# Patient Record
Sex: FEMALE | Race: WHITE | Employment: FULL TIME | URBAN - METROPOLITAN AREA
[De-identification: names, ages, dates, MRNs, and addresses within clinical notes are randomized per-mention and may not be internally consistent; named-entity substitution may affect disease eponyms.]

---

## 2018-09-18 ENCOUNTER — OFFICE VISIT (OUTPATIENT)
Dept: OBGYN CLINIC | Facility: CLINIC | Age: 53
End: 2018-09-18
Payer: COMMERCIAL

## 2018-09-18 VITALS
SYSTOLIC BLOOD PRESSURE: 120 MMHG | DIASTOLIC BLOOD PRESSURE: 80 MMHG | HEART RATE: 68 BPM | WEIGHT: 164.63 LBS | BODY MASS INDEX: 28.1 KG/M2 | HEIGHT: 64.25 IN

## 2018-09-18 DIAGNOSIS — Z12.4 SCREENING FOR MALIGNANT NEOPLASM OF CERVIX: ICD-10-CM

## 2018-09-18 DIAGNOSIS — Z01.419 ENCOUNTER FOR GYNECOLOGICAL EXAMINATION WITHOUT ABNORMAL FINDING: Primary | ICD-10-CM

## 2018-09-18 DIAGNOSIS — Z12.31 VISIT FOR SCREENING MAMMOGRAM: ICD-10-CM

## 2018-09-18 PROCEDURE — 99386 PREV VISIT NEW AGE 40-64: CPT | Performed by: OBSTETRICS & GYNECOLOGY

## 2018-09-18 RX ORDER — CARVEDILOL 12.5 MG/1
TABLET ORAL
COMMUNITY
Start: 2018-08-18

## 2018-09-18 RX ORDER — LISINOPRIL 5 MG/1
TABLET ORAL
COMMUNITY
Start: 2018-08-18

## 2018-09-18 NOTE — PROGRESS NOTES
Anais Hughes is a 48year old female [facility] Patient's last menstrual period was 05/08/2016. who presents for Patient presents with:  Gyn Exam: annual  She has no complaints. Pt has not had a pap for the past 10 years.       OBSTETRICS HISTORY:  Obstetric Hi Denies fatigue, night sweats, hot flashes  Eyes:  denies blurred or double vision  Cardiovascular:  denies chest pain or palpitations  Respiratory:  denies shortness of breath  Gastrointestinal:  denies heartburn, abdominal pain, diarrhea or constipation done,mammogram ordered,rtc 1 year for annual exam  Encounter for gynecological examination without abnormal finding  (primary encounter diagnosis)  No orders of the defined types were placed in this encounter.     Requested Prescriptions      No prescriptio

## 2018-09-19 LAB — HPV I/H RISK 1 DNA SPEC QL NAA+PROBE: NEGATIVE

## 2018-09-21 NOTE — PROGRESS NOTES
Neg pap, HPV neg, repeat pap needed in 3 years, return to clinic 1 year for annual exam Letter Sent via mail.

## 2018-09-26 ENCOUNTER — HOSPITAL ENCOUNTER (OUTPATIENT)
Dept: MAMMOGRAPHY | Age: 53
Discharge: HOME | End: 2018-09-26
Attending: OBSTETRICS & GYNECOLOGY
Payer: COMMERCIAL

## 2018-09-26 DIAGNOSIS — Z12.31 VISIT FOR SCREENING MAMMOGRAM: ICD-10-CM

## 2018-09-26 PROCEDURE — 77067 SCR MAMMO BI INCL CAD: CPT | Performed by: OBSTETRICS & GYNECOLOGY

## 2018-09-26 PROCEDURE — 77063 BREAST TOMOSYNTHESIS BI: CPT | Performed by: OBSTETRICS & GYNECOLOGY

## 2018-10-09 ENCOUNTER — HOSPITAL ENCOUNTER (OUTPATIENT)
Dept: ULTRASOUND IMAGING | Facility: HOSPITAL | Age: 53
Discharge: HOME | End: 2018-10-09
Attending: OBSTETRICS & GYNECOLOGY
Payer: COMMERCIAL

## 2018-10-09 ENCOUNTER — HOSPITAL ENCOUNTER (OUTPATIENT)
Dept: MAMMOGRAPHY | Facility: HOSPITAL | Age: 53
Discharge: HOME | End: 2018-10-09
Attending: OBSTETRICS & GYNECOLOGY
Payer: COMMERCIAL

## 2018-10-09 DIAGNOSIS — R92.8 ABNORMAL MAMMOGRAM: ICD-10-CM

## 2018-10-09 PROCEDURE — 77061 BREAST TOMOSYNTHESIS UNI: CPT | Performed by: OBSTETRICS & GYNECOLOGY

## 2018-10-09 PROCEDURE — 77065 DX MAMMO INCL CAD UNI: CPT | Performed by: OBSTETRICS & GYNECOLOGY

## 2018-10-09 PROCEDURE — 76642 ULTRASOUND BREAST LIMITED: CPT | Performed by: OBSTETRICS & GYNECOLOGY

## 2018-10-12 NOTE — PROGRESS NOTES
Benign appearing findings on mammogram, needs right diagnostic mammogram and right breast US in 6 months, please order and call pt

## 2018-10-17 ENCOUNTER — TELEPHONE (OUTPATIENT)
Dept: OBGYN CLINIC | Facility: CLINIC | Age: 53
End: 2018-10-17

## 2018-10-17 DIAGNOSIS — R92.2 DENSE BREAST TISSUE: Primary | ICD-10-CM

## 2018-10-17 NOTE — TELEPHONE ENCOUNTER
----- Message from Fox Mendoza MD sent at 10/11/2018 10:22 PM CDT -----  Benign appearing findings on mammogram, needs right diagnostic mammogram and right breast US in 6 months, please order and call pt

## 2019-05-01 ENCOUNTER — TELEPHONE (OUTPATIENT)
Dept: OBGYN CLINIC | Facility: CLINIC | Age: 54
End: 2019-05-01

## 2019-05-01 NOTE — TELEPHONE ENCOUNTER
LMTCB. Pt suppose to schedule right diag mammogram and right breast u/s in 6 months. Need to know if pt is going to schedule it.

## 2019-05-02 NOTE — TELEPHONE ENCOUNTER
PT REMINDED TO DO MAMMO AND POSS ULTRASOUND. SHE PLANS TO DO THIS IN June ONCE SHE IS DONE WITH SCHOOL FOR THE SUMMER. I GAVE HER THE CODES SO SHE CAN CHECK WITH HER INSURANCE.

## 2019-08-07 NOTE — TELEPHONE ENCOUNTER
Pt had a mammogram 10/2018. Recs by [facility] were benign appearing findings on mamm, needs right diag mammogram and right breast u/s in 6 months. It was due April 2019. Several messages were left for the pt.   Nurse talked to the pt 5/2019 and stated she wo

## 2019-09-17 ENCOUNTER — HOSPITAL ENCOUNTER (OUTPATIENT)
Dept: MAMMOGRAPHY | Facility: HOSPITAL | Age: 54
Discharge: HOME | End: 2019-09-17
Attending: OBSTETRICS & GYNECOLOGY
Payer: COMMERCIAL

## 2019-09-17 ENCOUNTER — HOSPITAL ENCOUNTER (OUTPATIENT)
Dept: ULTRASOUND IMAGING | Facility: HOSPITAL | Age: 54
Discharge: HOME | End: 2019-09-17
Attending: OBSTETRICS & GYNECOLOGY
Payer: COMMERCIAL

## 2019-09-17 DIAGNOSIS — R92.2 DENSE BREAST TISSUE: ICD-10-CM

## 2019-09-17 PROCEDURE — 77061 BREAST TOMOSYNTHESIS UNI: CPT | Performed by: OBSTETRICS & GYNECOLOGY

## 2019-09-17 PROCEDURE — 77065 DX MAMMO INCL CAD UNI: CPT | Performed by: OBSTETRICS & GYNECOLOGY

## 2019-09-17 PROCEDURE — 76642 ULTRASOUND BREAST LIMITED: CPT | Performed by: OBSTETRICS & GYNECOLOGY

## 2019-09-21 ENCOUNTER — TELEPHONE (OUTPATIENT)
Dept: OBGYN CLINIC | Facility: CLINIC | Age: 54
End: 2019-09-21

## 2019-09-21 NOTE — TELEPHONE ENCOUNTER
----- Message from Gina Montilla MD sent at 9/17/2019  8:09 PM CDT -----  Incomplete mammogram,needs additional views,call pt

## 2019-09-30 NOTE — TELEPHONE ENCOUNTER
Pt states she was called by radiology and was told she needed to come back for an [address]. Pt states she had an US at the same time as her mammo. Radiology told her they would look into it and told her that she is correct and now she needs to come back in 6 months for a follow up. Message to [facility]. See addendum in mammo report.

## 2019-10-26 ENCOUNTER — HOSPITAL ENCOUNTER (OUTPATIENT)
Age: 54
Discharge: HOME | End: 2019-10-26
Attending: EMERGENCY MEDICINE
Payer: COMMERCIAL

## 2019-10-26 VITALS
WEIGHT: 175 LBS | TEMPERATURE: 97 F | DIASTOLIC BLOOD PRESSURE: 65 MMHG | SYSTOLIC BLOOD PRESSURE: 116 MMHG | RESPIRATION RATE: 18 BRPM | HEART RATE: 92 BPM | OXYGEN SATURATION: 100 % | BODY MASS INDEX: 30 KG/M2

## 2019-10-26 DIAGNOSIS — J01.90 ACUTE SINUSITIS, RECURRENCE NOT SPECIFIED, UNSPECIFIED LOCATION: Primary | ICD-10-CM

## 2019-10-26 PROCEDURE — 99213 OFFICE O/P EST LOW 20 MIN: CPT

## 2019-10-26 PROCEDURE — 99204 OFFICE O/P NEW MOD 45 MIN: CPT

## 2019-10-26 RX ORDER — CEFDINIR 300 MG/1
300 CAPSULE ORAL 2 TIMES DAILY
Qty: 20 CAPSULE | Refills: 0 | Status: SHIPPED | OUTPATIENT
Start: 2019-10-26 | End: 2019-11-05

## 2019-10-26 RX ORDER — PREDNISONE 20 MG/1
40 TABLET ORAL DAILY
Qty: 10 TABLET | Refills: 0 | Status: SHIPPED | OUTPATIENT
Start: 2019-10-28 | End: 2019-11-02

## 2019-10-26 NOTE — ED PROVIDER NOTES
Patient Seen in: [address]      History   Patient presents with:  Sinus Problem    Stated Complaint: sinus pain     HPI    This patient complains of sinus pressure with a fullness sensation in both ears.   Patient denies palpation  Lungs are clear to auscultation  Cardiac there are normal first and second heart sounds  Neurologic there are no gross cranial nerve deficits patient moves all 4 extremities without impairment          MDM     Discussed empiric antibiotic treatm

## 2019-10-26 NOTE — ED INITIAL ASSESSMENT (HPI)
PATIENT ARRIVED AMBULATORY TO ROOM. PATIENT STATES \"I HAD SYMPTOMS A FEW WEEKS AGO. I THOUGHT I WAS GETTING BETTER BUT LAST NIGHT IT GOT BAD AGAIN\" NASAL CONGESTION. +SINUS/FACIAL PRESSURE. GREEN DISCHARGE FROM NOSE. NO COUGH. NO FEVERS.  NO N/V/D. EASY N

## 2021-03-03 DIAGNOSIS — Z23 NEED FOR VACCINATION: ICD-10-CM

## 2021-06-17 ENCOUNTER — TELEPHONE (OUTPATIENT)
Dept: OBGYN CLINIC | Facility: CLINIC | Age: 56
End: 2021-06-17

## 2021-06-17 DIAGNOSIS — R92.2 INCONCLUSIVE MAMMOGRAM: Primary | ICD-10-CM

## 2021-06-17 NOTE — TELEPHONE ENCOUNTER
Pt calling to request \"order for mammo and US if needed\" States mom with hx of breast CA at age 62. Denies any breast issues. States could not come in last year and was told need pap every 2 yrs.  Explained to pt that a pap is not necessarily done every y

## 2021-06-18 NOTE — TELEPHONE ENCOUNTER
Please send order for bilateral diagnostic mammogram with right breast ultrasound, diagnosis is previous inconclusive mammogram

## 2021-06-28 ENCOUNTER — HOSPITAL ENCOUNTER (OUTPATIENT)
Dept: ULTRASOUND IMAGING | Facility: HOSPITAL | Age: 56
Discharge: HOME | End: 2021-06-28
Attending: OBSTETRICS & GYNECOLOGY
Payer: COMMERCIAL

## 2021-06-28 ENCOUNTER — HOSPITAL ENCOUNTER (OUTPATIENT)
Dept: MAMMOGRAPHY | Facility: HOSPITAL | Age: 56
Discharge: HOME | End: 2021-06-28
Attending: OBSTETRICS & GYNECOLOGY
Payer: COMMERCIAL

## 2021-06-28 DIAGNOSIS — R92.2 INCONCLUSIVE MAMMOGRAM: ICD-10-CM

## 2021-06-28 PROCEDURE — 77062 BREAST TOMOSYNTHESIS BI: CPT | Performed by: OBSTETRICS & GYNECOLOGY

## 2021-06-28 PROCEDURE — 77066 DX MAMMO INCL CAD BI: CPT | Performed by: OBSTETRICS & GYNECOLOGY

## 2021-06-28 PROCEDURE — 76642 ULTRASOUND BREAST LIMITED: CPT | Performed by: OBSTETRICS & GYNECOLOGY

## 2021-09-21 ENCOUNTER — OFFICE VISIT (OUTPATIENT)
Dept: OBGYN CLINIC | Facility: CLINIC | Age: 56
End: 2021-09-21
Payer: COMMERCIAL

## 2021-09-21 VITALS
WEIGHT: 159 LBS | SYSTOLIC BLOOD PRESSURE: 114 MMHG | DIASTOLIC BLOOD PRESSURE: 73 MMHG | BODY MASS INDEX: 27 KG/M2 | HEART RATE: 61 BPM

## 2021-09-21 DIAGNOSIS — Z12.4 SCREENING FOR MALIGNANT NEOPLASM OF CERVIX: ICD-10-CM

## 2021-09-21 DIAGNOSIS — Z12.31 VISIT FOR SCREENING MAMMOGRAM: ICD-10-CM

## 2021-09-21 DIAGNOSIS — Z01.419 ENCOUNTER FOR GYNECOLOGICAL EXAMINATION WITHOUT ABNORMAL FINDING: Primary | ICD-10-CM

## 2021-09-21 PROCEDURE — 99396 PREV VISIT EST AGE 40-64: CPT | Performed by: OBSTETRICS & GYNECOLOGY

## 2021-09-21 PROCEDURE — 3078F DIAST BP <80 MM HG: CPT | Performed by: OBSTETRICS & GYNECOLOGY

## 2021-09-21 PROCEDURE — 3074F SYST BP LT 130 MM HG: CPT | Performed by: OBSTETRICS & GYNECOLOGY

## 2021-09-21 NOTE — PROGRESS NOTES
Erich Nichols is a 64year old female  No LMP recorded. (Menstrual status: Menopause). who presents for Patient presents with:  Gyn Exam: ANNUAL,MAMMO   She has no complaints.     OBSTETRICS HISTORY:  OB History     T0    L0    SAB0  IAB0 heartburn, abdominal pain, diarrhea or constipation  Genitourinary:  denies dysuria, incontinence, abnormal vaginal discharge, vaginal itching  Musculoskeletal:  denies back pain. Skin/Breast:  Denies any breast pain, lumps, or discharge.    Neurological: gynecological examination without abnormal finding  (primary encounter diagnosis)  Visit for screening mammogram  No orders of the defined types were placed in this encounter.     Requested Prescriptions      No prescriptions requested or ordered in this en

## 2021-09-22 LAB — HPV I/H RISK 1 DNA SPEC QL NAA+PROBE: NEGATIVE

## 2022-07-14 ENCOUNTER — HOSPITAL ENCOUNTER (OUTPATIENT)
Dept: MAMMOGRAPHY | Facility: HOSPITAL | Age: 57
Discharge: HOME | End: 2022-07-14
Attending: OBSTETRICS & GYNECOLOGY
Payer: COMMERCIAL

## 2022-07-14 DIAGNOSIS — Z12.31 VISIT FOR SCREENING MAMMOGRAM: ICD-10-CM

## 2022-07-14 PROCEDURE — 77063 BREAST TOMOSYNTHESIS BI: CPT | Performed by: OBSTETRICS & GYNECOLOGY

## 2022-07-14 PROCEDURE — 77067 SCR MAMMO BI INCL CAD: CPT | Performed by: OBSTETRICS & GYNECOLOGY

## 2022-11-19 ENCOUNTER — HOSPITAL ENCOUNTER (OUTPATIENT)
Age: 57
Discharge: HOME | End: 2022-11-19
Attending: EMERGENCY MEDICINE
Payer: COMMERCIAL

## 2022-11-19 VITALS
RESPIRATION RATE: 20 BRPM | HEART RATE: 80 BPM | TEMPERATURE: 98 F | DIASTOLIC BLOOD PRESSURE: 72 MMHG | OXYGEN SATURATION: 98 % | SYSTOLIC BLOOD PRESSURE: 132 MMHG

## 2022-11-19 DIAGNOSIS — H66.001 NON-RECURRENT ACUTE SUPPURATIVE OTITIS MEDIA OF RIGHT EAR WITHOUT SPONTANEOUS RUPTURE OF TYMPANIC MEMBRANE: Primary | ICD-10-CM

## 2022-11-19 LAB — SARS-COV-2 RNA RESP QL NAA+PROBE: NOT DETECTED

## 2022-11-19 PROCEDURE — 99203 OFFICE O/P NEW LOW 30 MIN: CPT

## 2022-11-19 PROCEDURE — 99213 OFFICE O/P EST LOW 20 MIN: CPT

## 2022-11-19 RX ORDER — AMOXICILLIN 500 MG/1
500 TABLET, FILM COATED ORAL 3 TIMES DAILY
Qty: 30 TABLET | Refills: 0 | Status: SHIPPED | OUTPATIENT
Start: 2022-11-19 | End: 2022-11-29

## 2022-11-19 RX ORDER — BENZONATATE 100 MG/1
100 CAPSULE ORAL 3 TIMES DAILY PRN
Qty: 30 CAPSULE | Refills: 0 | Status: SHIPPED | OUTPATIENT
Start: 2022-11-19 | End: 2022-12-19

## 2022-11-19 NOTE — ED INITIAL ASSESSMENT (HPI)
Patient with cough and congestion starting on Thursday. Also with right ear pain. Negative at home covid tests.

## 2024-07-16 ENCOUNTER — TELEPHONE (OUTPATIENT)
Dept: OBGYN CLINIC | Facility: CLINIC | Age: 59
End: 2024-07-16

## 2024-07-16 NOTE — TELEPHONE ENCOUNTER
Patient wants a mammogram order. Patient last annual with Dr Osborne was 9/21/21. Patient scheduled apt for annual 2/4/24

## 2024-07-16 NOTE — TELEPHONE ENCOUNTER
Phone rang then went to busy signal without an option to leave a voicemail. PSR when patient calls back please notify that since she has been seen in 3 years then she needs breast exam first during annual visit and Dr. Osborne will provide appropriate mammogram order at that time. There are options for sooner gyne slot with Dr. Osborne in December.

## 2024-11-18 ENCOUNTER — OFFICE VISIT (OUTPATIENT)
Dept: OBGYN CLINIC | Facility: CLINIC | Age: 59
End: 2024-11-18
Payer: COMMERCIAL

## 2024-11-18 VITALS
HEART RATE: 74 BPM | WEIGHT: 154.38 LBS | DIASTOLIC BLOOD PRESSURE: 77 MMHG | SYSTOLIC BLOOD PRESSURE: 122 MMHG | BODY MASS INDEX: 26 KG/M2

## 2024-11-18 DIAGNOSIS — Z12.31 ENCOUNTER FOR SCREENING MAMMOGRAM FOR BREAST CANCER: Primary | ICD-10-CM

## 2024-11-18 DIAGNOSIS — Z01.419 ENCOUNTER FOR GYNECOLOGICAL EXAMINATION: ICD-10-CM

## 2024-11-18 PROCEDURE — 3074F SYST BP LT 130 MM HG: CPT | Performed by: OBSTETRICS & GYNECOLOGY

## 2024-11-18 PROCEDURE — 3078F DIAST BP <80 MM HG: CPT | Performed by: OBSTETRICS & GYNECOLOGY

## 2024-11-18 PROCEDURE — 99396 PREV VISIT EST AGE 40-64: CPT | Performed by: OBSTETRICS & GYNECOLOGY

## 2024-11-18 RX ORDER — ATORVASTATIN CALCIUM 20 MG/1
20 TABLET, FILM COATED ORAL DAILY
COMMUNITY

## 2024-11-19 NOTE — PROGRESS NOTES
Azucena Hurd is a 59 year old female  No LMP recorded. (Menstrual status: Menopause). here for annual exam.       [facility] pt--last seen 2021.   Last pap 2021 normal with negative HPV.   Pt does not want pap today.  Last mammogram 2022.    No gyne issues.    OBSTETRICS HISTORY:  OB History    Para Term  AB Living   0 0 0 0 0 0   SAB IAB Ectopic Multiple Live Births   0 0 0 0 0       GYNE HISTORY   Pap Date: 21  Pap Result Notes: PAP HPV NEG  Follow Up Recommendation: MAMMO 23 TERRI BENIGN  ANNUAL 21 [facility]    MEDICAL HISTORY:  Past Medical History:    Congestive heart failure (CHF) (HCC)     Past Surgical History:   Procedure Laterality Date    Breast biopsy Left     x 2, benign    Inner ear surgery proc unlisted      reconstruction, multiple surgeries as she grew up    Needle biopsy right      Reduction left  1995    Reduction of large breast      Reduction right         SOCIAL HISTORY:  Social History     Socioeconomic History    Marital status:    Tobacco Use    Smoking status: Never    Smokeless tobacco: Never   Vaping Use    Vaping status: Never Used   Substance and Sexual Activity    Alcohol use: No    Drug use: No    Sexual activity: Yes     Partners: Male     Social Drivers of Health     Financial Resource Strain: Low Risk  (2024)    Received from [facility]    Overall Financial Resource Strain (CARDIA)     Difficulty of Paying Living Expenses: Not very hard   Food Insecurity: No Food Insecurity (2024)    Received from [facility]    Hunger Vital Sign     Worried About Running Out of Food in the Last Year: Never true     Ran Out of Food in the Last Year: Never true   Transportation Needs: No Transportation Needs (2024)    Received from [facility]    PRAPARE - Transportation     Lack of Transportation (Medical): No     Lack of Transportation (Non-Medical): No   Housing  Stability: Low Risk  (7/18/2024)    Received from [facility]    Housing Stability Vital Sign     Unable to Pay for Housing in the Last Year: No     Number of Places Lived in the Last Year: 1     Unstable Housing in the Last Year: No       FAMILY HISTORY:  Family History   Problem Relation Age of Onset    Cancer Father 60        bladder cancer    Diabetes Father     Heart Disorder Father 65        Congested heart failure    Cancer Mother 57        breast cancer    Breast Cancer Mother 58    Heart Disorder Maternal Grandfather 55        Heart attack    Other (Other) Paternal Grandmother 70        Stroke    Breast Cancer Maternal Cousin Female 56       MEDICATIONS:  Current Outpatient Medications   Medication Sig Dispense Refill    atorvastatin 20 MG Oral Tab Take 1 tablet (20 mg total) by mouth daily.      carvedilol 12.5 MG Oral Tab       lisinopril 5 MG Oral Tab          ALLERGIES:  Allergies[1]      Depression Screening (PHQ-2/PHQ-9): Over the LAST 2 WEEKS   Little interest or pleasure in doing things (over the last two weeks)?: Not at all    Feeling down, depressed, or hopeless (over the last two weeks)?: Not at all    PHQ-2 SCORE: 0           Review of Systems:  Constitutional:  Denies fatigue, night sweats, hot flashes  Eyes:  denies blurred or double vision  Cardiovascular:  denies chest pain or palpitations  Respiratory:  denies shortness of breath  Gastrointestinal:  denies heartburn, abdominal pain, diarrhea or constipation  Genitourinary:  denies dysuria, incontinence, abnormal vaginal discharge, vaginal itching  Musculoskeletal:  denies back pain.  Skin/Breast:  Denies any breast pain, lumps, or discharge.   Neurological:  denies headaches, extremity weakness or numbness.  Psychiatric: denies depression or anxiety.  Endocrine:   denies excessive thirst or urination.  Heme/Lymph:  easy bruising or bleeding.    PHYSICAL EXAM:   /77   Pulse 74   Wt 154 lb 6.4 oz (70 kg)   BMI  26.30 kg/m²   Wt Readings from Last 2 Encounters:   11/18/24 154 lb 6.4 oz (70 kg)   09/21/21 159 lb (72.1 kg)     Body mass index is 26.3 kg/m².    Constitutional: well developed, well nourished  Neck/Thyroid: thyroid symmetric, no nodules  Heart:  Regular rate and rhythm  Lungs:  Clear to asculation  Breast: normal without palpable masses, tenderness, asymmetry, nipple discharge, nipple retraction or skin changes  Abdomen:  soft, nontender, nondistended, no masses  Psychiatric:  Oriented to time, place, person and situation. Appropriate mood and affect    Pelvic Exam:  External Genitalia: normal appearance, hair distribution, and no lesions  Urethral Meatus:  normal in size, location, without lesions and prolapse  Bladder:  No fullness, masses or tenderness  Vagina:  Normal appearance without lesions, no abnormal discharge  Cervix:  Normal without tenderness on motion  Uterus: normal in size, contour, position, mobility, without tenderness  Adnexa: normal without masses or tenderness  Perineum/anus: normal      Assessment & Plan:    Azucena Hurd is a 59 year old female who presents for an annual physical exam.    1. Encounter for gynecological examination  Pap not done.  ASCCP guidelines reviewed.   Encouraged annual exam.   Order for mammogram.   RTC 1 year or prn     2. Encounter for screening mammogram for breast cancer  - [facility] 2D+3D SCREENING BILAT (CPT=77067/11700); Future        Requested Prescriptions      No prescriptions requested or ordered in this encounter         Ana Johnson MD  11/19/2024  9:04 AM         [1] No Known Allergies

## 2024-12-21 ENCOUNTER — HOSPITAL ENCOUNTER (OUTPATIENT)
Dept: MAMMOGRAPHY | Age: 59
Discharge: HOME | End: 2024-12-21
Attending: OBSTETRICS & GYNECOLOGY
Payer: COMMERCIAL

## 2024-12-21 DIAGNOSIS — Z12.31 ENCOUNTER FOR SCREENING MAMMOGRAM FOR BREAST CANCER: ICD-10-CM

## 2024-12-21 PROCEDURE — 77063 BREAST TOMOSYNTHESIS BI: CPT | Performed by: OBSTETRICS & GYNECOLOGY

## 2024-12-21 PROCEDURE — 77067 SCR MAMMO BI INCL CAD: CPT | Performed by: OBSTETRICS & GYNECOLOGY

## 2024-12-26 ENCOUNTER — HOSPITAL ENCOUNTER (OUTPATIENT)
Age: 59
Discharge: HOME | End: 2024-12-26
Attending: EMERGENCY MEDICINE
Payer: COMMERCIAL

## 2024-12-26 VITALS
TEMPERATURE: 98 F | SYSTOLIC BLOOD PRESSURE: 96 MMHG | RESPIRATION RATE: 16 BRPM | DIASTOLIC BLOOD PRESSURE: 56 MMHG | OXYGEN SATURATION: 98 % | HEART RATE: 90 BPM

## 2024-12-26 DIAGNOSIS — J01.41 ACUTE RECURRENT PANSINUSITIS: Primary | ICD-10-CM

## 2024-12-26 PROCEDURE — 99213 OFFICE O/P EST LOW 20 MIN: CPT

## 2024-12-26 RX ORDER — BENZONATATE 100 MG/1
100 CAPSULE ORAL 3 TIMES DAILY PRN
Qty: 30 CAPSULE | Refills: 0 | Status: SHIPPED | OUTPATIENT
Start: 2024-12-26 | End: 2025-01-25

## 2024-12-26 NOTE — ED INITIAL ASSESSMENT (HPI)
Patient arrives ambulatory with c/o 10 days of cough and sinus pain, worse at night. Denies fevers.

## 2024-12-26 NOTE — ED PROVIDER NOTES
Patient Seen in: [facility]      History     Chief Complaint   Patient presents with    Cough/URI    Sinus Problem     Stated Complaint: URI/Cough    Subjective:   HPI      The patient is a 59-year-old female with past history of recurrent sinusitis, reconstructive ear surgery who presents now with cough, sinus pain/pressure/drainage.  The patient states the symptoms began approximately 10 days ago.  Patient denies any fever.  The patient describes persistent sinus pain and pressure associated with greenish nasal discharge.  The patient has also had some persistent cough.  Patient denies any fever.  The patient denies any chest pain.    Objective:     Past Medical History:    Congestive heart failure (CHF) (HCC)              Past Surgical History:   Procedure Laterality Date    Breast biopsy Left     x 2, benign    Inner ear surgery proc unlisted  1996    reconstruction, multiple surgeries as she grew up    Needle biopsy right  2006    Reduction left  1995    Reduction of large breast  1995    Reduction right  1995                Social History     Socioeconomic History    Marital status:    Tobacco Use    Smoking status: Never    Smokeless tobacco: Never   Vaping Use    Vaping status: Never Used   Substance and Sexual Activity    Alcohol use: No    Drug use: No    Sexual activity: Yes     Partners: Male     Social Drivers of Health     Financial Resource Strain: Low Risk  (7/18/2024)    Received from [facility]    Overall Financial Resource Strain (CARDIA)     Difficulty of Paying Living Expenses: Not very hard   Food Insecurity: No Food Insecurity (7/18/2024)    Received from [facility]    Hunger Vital Sign     Worried About Running Out of Food in the Last Year: Never true     Ran Out of Food in the Last Year: Never true   Transportation Needs: No Transportation Needs (7/18/2024)    Received from [facility]    PRAPAR -  Transportation     Lack of Transportation (Medical): No     Lack of Transportation (Non-Medical): No   Housing Stability: Low Risk  (7/18/2024)    Received from [facility]    Housing Stability Vital Sign     Unable to Pay for Housing in the Last Year: No     Number of Places Lived in the Last Year: 1     Unstable Housing in the Last Year: No              Review of Systems    Positive for stated complaint: URI/Cough  Other systems are as noted in HPI.  Constitutional and vital signs reviewed.      All other systems reviewed and negative except as noted above.    Physical Exam     ED Triage Vitals [12/26/24 0824]   BP 96/56   Pulse 90   Resp 16   Temp 98.1 °F (36.7 °C)   Temp src Oral   SpO2 98 %   O2 Device None (Room air)       Current Vitals:   Vital Signs  BP: 96/56  Pulse: 90  Resp: 16  Temp: 98.1 °F (36.7 °C)  Temp src: Oral    Oxygen Therapy  SpO2: 98 %  O2 Device: None (Room air)        Physical Exam    Constitutional: Well-developed well-nourished in no acute distress  Head: Normocephalic, no swelling or tenderness  Eyes: Nonicteric sclera, no conjunctival injection  ENT: TMs are clear and flat bilaterally.  There is no posterior pharyngeal erythema  Chest: Clear to auscultation, no tenderness  Cardiovascular: Regular rate and rhythm without murmur  Abdomen: Soft, nontender and nondistended  Neurologic: Patient is awake, alert and oriented ×3.  The patient's motor strength is 5 out of 5 and symmetric in the upper and lower extremities bilaterally  Extremities: No focal swelling or tenderness  Skin: No pallor, no redness or warmth to the touch      ED Course   Labs Reviewed - No data to display         Pulse ox is 98% on room air, normal       MDM      Viral URI versus recurrent sinusitis        Medical Decision Making      Disposition and Plan     Clinical Impression:  1. Acute recurrent pansinusitis         Disposition:  Discharge  12/26/2024  8:37 am    Follow-up:  [facility]  [place] [address] [phone]    In 3 days  If symptoms worsen          Medications Prescribed:  Current Discharge Medication List        START taking these medications    Details   amoxicillin clavulanate 875-125 MG Oral Tab Take 1 tablet by mouth 2 (two) times daily for 10 days.  Qty: 20 tablet, Refills: 0      benzonatate 100 MG Oral Cap Take 1 capsule (100 mg total) by mouth 3 (three) times daily as needed for cough.  Qty: 30 capsule, Refills: 0                 Supplementary Documentation:

## (undated) NOTE — MR AVS SNAPSHOT
After Visit Summary   9/21/2021    Aston Hendrix   MRN: [id number]           Visit Information     Date & Time  9/21/2021  3:20 PM Provider  Annamaria Dance, MD [address] [place] [address], [address] [place] [address] Dept.  Phone  [phone]      Your [facility], [facility], [address] [place] [address]. [address] [place] [address]    JOHAN Storey    It is the patient's responsibility to check with and follow their insurance company's guidelines immediate attention.  Average cost  $70*   [facility]  Monday – Friday  8:00 am – 8:00 pm   Saturday – Sunday  8:00 am – 4:00 pm    [facility]  Primary Care Providers  Treatment for acute illness   or injury that are

## (undated) NOTE — LETTER
9/21/2018              [address] 27         Dear Dominguez Mariee,      It was a pleasure to see you at our [address] [facility] office.  Neg pap, HPV neg, repeat pap needed in 3 yea

## (undated) NOTE — LETTER
11/7/2018              [address] [place] [address]         Dear Mignon Mortimer,    This letter is to inform you that our office has made several attempts to reach you by phone without success.   We were attempting to contact you by

## (undated) NOTE — LETTER
8/12/2019              [address] 27         Dear Sherrie Borges had a mammogram 2D & 3D Diag Additional Views Right side on 10-9-18 and it was recommended that you schedule  a six month follow up Diag